# Patient Record
Sex: FEMALE | Race: WHITE | NOT HISPANIC OR LATINO | Employment: OTHER | URBAN - METROPOLITAN AREA
[De-identification: names, ages, dates, MRNs, and addresses within clinical notes are randomized per-mention and may not be internally consistent; named-entity substitution may affect disease eponyms.]

---

## 2021-01-20 ENCOUNTER — TRANSFERRED RECORDS (OUTPATIENT)
Dept: HEALTH INFORMATION MANAGEMENT | Facility: CLINIC | Age: 66
End: 2021-01-20

## 2021-01-20 ENCOUNTER — MEDICAL CORRESPONDENCE (OUTPATIENT)
Dept: HEALTH INFORMATION MANAGEMENT | Facility: CLINIC | Age: 66
End: 2021-01-20

## 2021-01-26 ENCOUNTER — TRANSCRIBE ORDERS (OUTPATIENT)
Dept: OTHER | Age: 66
End: 2021-01-26

## 2021-01-26 DIAGNOSIS — G90.89 AUTOIMMUNE AUTONOMIC GANGLIONOPATHY: Primary | ICD-10-CM

## 2021-02-10 ENCOUNTER — DOCUMENTATION ONLY (OUTPATIENT)
Dept: CARE COORDINATION | Facility: CLINIC | Age: 66
End: 2021-02-10

## 2021-03-09 ENCOUNTER — TELEPHONE (OUTPATIENT)
Dept: NEUROLOGY | Facility: CLINIC | Age: 66
End: 2021-03-09

## 2021-03-09 ENCOUNTER — OFFICE VISIT (OUTPATIENT)
Dept: NEUROLOGY | Facility: CLINIC | Age: 66
End: 2021-03-09
Attending: PSYCHIATRY & NEUROLOGY
Payer: MEDICARE

## 2021-03-09 DIAGNOSIS — G62.9 SMALL FIBER NEUROPATHY: ICD-10-CM

## 2021-03-09 DIAGNOSIS — R20.9 DISTURBANCE OF SKIN SENSATION: Primary | ICD-10-CM

## 2021-03-09 PROCEDURE — 11105 PUNCH BX SKIN EA SEP/ADDL: CPT | Performed by: PSYCHIATRY & NEUROLOGY

## 2021-03-09 PROCEDURE — 11104 PUNCH BX SKIN SINGLE LESION: CPT | Performed by: PSYCHIATRY & NEUROLOGY

## 2021-03-09 NOTE — PROGRESS NOTES
Procedure note: Skin biopsy. Indication: sensory disturbance, skin. After obtaining informed consent, 3 mm PUNCH skin biopsies were performed over the extensor digitorum brevis muscle of the right foot, and the right medial calf, about 10 cm distally to the medial knee joint. 1% lidocaine anesthesia and betadine sterile prep were used. The patient tolerated the procedure well. Wound care and patient education were provided by Kristy Behling, REEGT. Following completion of the procedure, both the performing physician and the assistant, independently verified the presence of one skin sample in each of the two (2) vials sent to the Lab. Aroldo Alvarez MD

## 2021-03-09 NOTE — LETTER
3/9/2021       RE: Margarita Abdi  [address] [facility] [place] [address]     Dear Colleague,    Thank you for referring your patient, Margarita Abdi, to the [facility] [place] [facility]. Please see a copy of my visit note below.    Procedure note: Skin biopsy. Indication: sensory disturbance, skin. After obtaining informed consent, 3 mm PUNCH skin biopsies were performed over the extensor digitorum brevis muscle of the right foot, and the right medial calf, about 10 cm distally to the medial knee joint. 1% lidocaine anesthesia and betadine sterile prep were used. The patient tolerated the procedure well. Wound care and patient education were provided by Kristy Behling, REEGT. Following completion of the procedure, both the performing physician and the assistant, independently verified the presence of one skin sample in each of the two (2) vials sent to the Lab. Aroldo Alvarez MD

## 2021-03-22 LAB — LAB SCANNED RESULT: NORMAL

## 2021-04-10 ENCOUNTER — HEALTH MAINTENANCE LETTER (OUTPATIENT)
Age: 66
End: 2021-04-10

## 2021-07-31 ENCOUNTER — HEALTH MAINTENANCE LETTER (OUTPATIENT)
Age: 66
End: 2021-07-31

## 2021-09-25 ENCOUNTER — HEALTH MAINTENANCE LETTER (OUTPATIENT)
Age: 66
End: 2021-09-25

## 2022-03-28 ENCOUNTER — CARE COORDINATION (OUTPATIENT)
Dept: SLEEP MEDICINE | Facility: CLINIC | Age: 67
End: 2022-03-28
Payer: MEDICARE

## 2022-03-28 DIAGNOSIS — G47.52 RBD (REM BEHAVIORAL DISORDER): Primary | ICD-10-CM

## 2022-03-28 NOTE — PROGRESS NOTES
Brief Sleep Staff Note    Patient with history of autonomic symptoms (orthostasis, constipation) along with anosmia and symptoms suggestive of REM sleep behavior disorder (vivid dreams, loud sleep talking, ? Dream enactment).      The patients bedtime is 2200, reads until 2300.     No troubles falling asleep.    Intermittent awakenings until 0730.     She is sluggish in the AM    Parasomnias-Sleep talking    Assessment/Plan  Parasomnias possible REM sleep behavior disorder in setting of autonomic disease raises concerns for pure autonomic failure and/or multiple system atrophy.      Discussed benefits of identifying RBD both in terms of management to avoid sleep related injury as well as to better understand her other ancillary neurological symptoms and whether they fit into a neurological syndrome.      All questions were answered.    It is a great privilege being asked to participate in this patients care.  The patient has been advised on the importance in of never operating operating a motor vehicle while tired or sleepy.

## 2022-05-07 ENCOUNTER — HEALTH MAINTENANCE LETTER (OUTPATIENT)
Age: 67
End: 2022-05-07

## 2022-05-22 ENCOUNTER — THERAPY VISIT (OUTPATIENT)
Dept: SLEEP MEDICINE | Facility: CLINIC | Age: 67
End: 2022-05-22
Attending: PSYCHIATRY & NEUROLOGY
Payer: MEDICARE

## 2022-05-22 DIAGNOSIS — G47.52 RBD (REM BEHAVIORAL DISORDER): ICD-10-CM

## 2022-05-22 PROCEDURE — 95810 POLYSOM 6/> YRS 4/> PARAM: CPT | Performed by: PSYCHIATRY & NEUROLOGY

## 2022-05-24 NOTE — PROCEDURES
" SLEEP STUDY INTERPRETATION  DIAGNOSTIC POLYSOMNOGRAPHY REPORT      Patient: RILEY VEGA  YOB: 1955  Study Date: 5/22/2022  MRN: [id number]  Referring Provider: MD Perez Adam  Ordering Provider: MD Sethi Michael    Indications for Polysomnography: The patient is a 67 year old Female who is 5' 11\" and weighs 145.0 lbs. Her BMI is 20.3, [place] sleepiness scale 14 and neck circumference is 32 cm. Relevant medical history includes dream enactment, autonomic dysfunction. A diagnostic polysomnogram was performed to evaluate for RBD.    Polysomnogram Data: A full night polysomnogram recorded the standard physiologic parameters including EEG, EOG, EMG, ECG, nasal and oral airflow. Respiratory parameters of chest and abdominal movements were recorded with respiratory inductance plethysmography. Oxygen saturation was recorded by pulse oximetry. Hypopnea scoring rule used: 1B 4%.    Sleep Architecture: Sleep fragmentation  The total recording time of the polysomnogram was 491.5 minutes. The total sleep time was 419.0 minutes. Sleep latency was 13.0 minutes. REM latency was 91.5 minutes. Arousal index was 48.7 arousals per hour. Sleep efficiency was 85.2%. Wake after sleep onset was 59.5 minutes. The patient spent 4.1% of total sleep time in Stage N1, 74.0% in Stage N2, 11.7% in Stage N3, and 10.3% in REM. Time in REM supine was 0 minutes.    Respiration: This study did not demonstrate clinically significant sleep disordered breathing.  This was a good study but did not include REM supine.      Events ? The polysomnogram revealed a presence of 1 obstructive, - central, and - mixed apneas resulting in an apnea index of 0.1 events per hour. There were 2 obstructive hypopneas and - central hypopneas resulting in an obstructive hypopnea index of 0.3 and central hypopnea index of - events per hour. The combined apnea/hypopnea index was 0.4 events per hour (central apnea/hypopnea index was - events " per hour). The REM AHI was 1.4 events per hour. The supine AHI was 2.4 events per hour. The RERA index was 2.3 events per hour.  The RDI was 2.7 events per hour.    Snoring - was reported as mild.    Respiratory rate and pattern - was notable for normal respiratory rate and pattern.    Sustained Sleep Associated Hypoventilation - Transcutaneous carbon dioxide monitoring was not used.    Sleep Associated Hypoxemia - (Greater than 5 minutes O2 sat at or below 88%) was not present. Baseline oxygen saturation was 95.0%. Lowest oxygen saturation was 88.0%. Time spent less than or equal to 88% was 0 minutes. Time spent less than or equal to 89% was 0 minutes.    Movement Activity: Increased motor activity throughout sleep (increased PLMs during NREM and increased transient motor activity during REM).      Periodic Limb Activity - There were 732 PLMs during the entire study. The PLM index was 104.8 movements per hour. The PLM Arousal Index was 33.8 per hour.    REM EMG Activity - Excessive transient muscle activity was present.    Nocturnal Behavior - Abnormal sleep related behaviors were noted during REM sleep. The behaviors appeared to be consistent with dream enactment. These events were characterized as hand babbling with vocalization.    Bruxism - None apparent.    Cardiac Summary: Sinus  The average pulse rate was 67.4 bpm. The minimum pulse rate was 60.0 bpm while the maximum pulse rate was 87.0 bpm.      Assessment:     This study did not demonstrate clinically significant sleep disordered breathing.  This was a good study but did not include REM supine.      Increased motor activity throughout sleep (increased PLMs during NREM and increased transient motor activity during REM).      Recommendations:    Recommend a diagnosis and management of RBD.   o Environmental safety  o High dose melatonin and/or low dose clonazepam    Patient may be reassured that per this study they did not demonstrate clinically significant  sleep disordered breathing. Advice regarding the risks of drowsy driving.    Suggest optimizing sleep schedule and avoiding sleep deprivation.    Treatment of PLMs (alpha 2 delta ligands, dopaminergics) should be focused upon symptoms of motor restlessness, a high suspicion for PLM disorder and not for PLMs alone.    Diagnostic Codes: G47.52      Frederic Sethi MD 5-24-22  Diplomate, ABPN Sleep Medicine

## 2022-05-25 LAB — SLPCOMP: NORMAL

## 2022-07-17 ASSESSMENT — SLEEP AND FATIGUE QUESTIONNAIRES
HOW LIKELY ARE YOU TO NOD OFF OR FALL ASLEEP WHEN YOU ARE A PASSENGER IN A CAR FOR AN HOUR WITHOUT A BREAK: MODERATE CHANCE OF DOZING
HOW LIKELY ARE YOU TO NOD OFF OR FALL ASLEEP WHILE LYING DOWN TO REST IN THE AFTERNOON WHEN CIRCUMSTANCES PERMIT: HIGH CHANCE OF DOZING
HOW LIKELY ARE YOU TO NOD OFF OR FALL ASLEEP WHILE SITTING QUIETLY AFTER LUNCH WITHOUT ALCOHOL: MODERATE CHANCE OF DOZING
HOW LIKELY ARE YOU TO NOD OFF OR FALL ASLEEP WHILE SITTING INACTIVE IN A PUBLIC PLACE: SLIGHT CHANCE OF DOZING
HOW LIKELY ARE YOU TO NOD OFF OR FALL ASLEEP WHILE SITTING AND READING: MODERATE CHANCE OF DOZING
HOW LIKELY ARE YOU TO NOD OFF OR FALL ASLEEP IN A CAR, WHILE STOPPED FOR A FEW MINUTES IN TRAFFIC: WOULD NEVER DOZE
HOW LIKELY ARE YOU TO NOD OFF OR FALL ASLEEP WHILE SITTING AND TALKING TO SOMEONE: WOULD NEVER DOZE
HOW LIKELY ARE YOU TO NOD OFF OR FALL ASLEEP WHILE WATCHING TV: SLIGHT CHANCE OF DOZING

## 2022-07-18 ENCOUNTER — OFFICE VISIT (OUTPATIENT)
Dept: SLEEP MEDICINE | Facility: CLINIC | Age: 67
End: 2022-07-18
Payer: MEDICARE

## 2022-07-18 VITALS
WEIGHT: 140 LBS | HEART RATE: 86 BPM | HEIGHT: 71 IN | OXYGEN SATURATION: 99 % | SYSTOLIC BLOOD PRESSURE: 87 MMHG | BODY MASS INDEX: 19.6 KG/M2 | DIASTOLIC BLOOD PRESSURE: 56 MMHG

## 2022-07-18 DIAGNOSIS — G47.52 RBD (REM BEHAVIORAL DISORDER): Primary | ICD-10-CM

## 2022-07-18 PROCEDURE — 99214 OFFICE O/P EST MOD 30 MIN: CPT | Performed by: PSYCHIATRY & NEUROLOGY

## 2022-07-18 RX ORDER — LETROZOLE 2.5 MG/1
2.5 TABLET, FILM COATED ORAL
COMMUNITY
Start: 2021-07-29 | End: 2022-07-24

## 2022-07-18 RX ORDER — FLUDROCORTISONE ACETATE 0.1 MG/1
0.1 TABLET ORAL
COMMUNITY
Start: 2022-03-16

## 2022-07-18 RX ORDER — TOPIRAMATE 100 MG/1
TABLET, FILM COATED ORAL
COMMUNITY
Start: 2020-10-22

## 2022-07-18 RX ORDER — BUPROPION HYDROCHLORIDE 75 MG/1
75 TABLET ORAL
COMMUNITY
Start: 2022-04-27

## 2022-07-18 RX ORDER — GABAPENTIN 100 MG/1
100 CAPSULE ORAL
COMMUNITY
Start: 2022-05-19

## 2022-07-18 RX ORDER — MIDODRINE HYDROCHLORIDE 5 MG/1
TABLET ORAL
COMMUNITY
Start: 2022-05-03

## 2022-07-18 RX ORDER — PYRIDOSTIGMINE BROMIDE 60 MG/1
60 TABLET ORAL
COMMUNITY
Start: 2021-12-07

## 2022-07-18 RX ORDER — FERROUS SULFATE 325(65) MG
TABLET ORAL
COMMUNITY

## 2022-07-18 RX ORDER — CYANOCOBALAMIN 1000 UG/ML
1000 INJECTION, SOLUTION INTRAMUSCULAR; SUBCUTANEOUS
COMMUNITY
Start: 2022-07-13 | End: 2023-06-13

## 2022-07-18 NOTE — NURSING NOTE
"Chief Complaint   Patient presents with     Study Results     PSG f/u       Initial BP (!) 87/56   Pulse 86   Ht 1.791 m (5' 10.5\")   Wt 63.5 kg (140 lb)   SpO2 99%   BMI 19.80 kg/m   Estimated body mass index is 19.8 kg/m  as calculated from the following:    Height as of this encounter: 1.791 m (5' 10.5\").    Weight as of this encounter: 63.5 kg (140 lb).    Medication Reconciliation: complete  ESS 11  Soraya Echavarria MA  "

## 2023-01-18 ENCOUNTER — TELEPHONE (OUTPATIENT)
Dept: NUCLEAR MEDICINE | Facility: CLINIC | Age: 68
End: 2023-01-18

## 2023-02-01 ENCOUNTER — TELEPHONE (OUTPATIENT)
Dept: CARDIOLOGY | Facility: CLINIC | Age: 68
End: 2023-02-01
Payer: MEDICARE

## 2023-02-01 NOTE — TELEPHONE ENCOUNTER
VM left on home and mobile numbers to hold Bupropion starting today for 2/9 scan. Left detailed message and requested a call back.

## 2023-02-09 ENCOUNTER — ANCILLARY PROCEDURE (OUTPATIENT)
Dept: NUCLEAR MEDICINE | Facility: CLINIC | Age: 68
End: 2023-02-09
Attending: PSYCHIATRY & NEUROLOGY
Payer: MEDICARE

## 2023-02-09 DIAGNOSIS — G20.A1 PARKINSON'S DISEASE (H): ICD-10-CM

## 2023-02-09 PROCEDURE — 78803 RP LOCLZJ TUM SPECT 1 AREA: CPT | Performed by: RADIOLOGY

## 2023-02-09 PROCEDURE — A9584 IODINE I-123 IOFLUPANE: HCPCS | Performed by: RADIOLOGY

## 2023-02-09 RX ORDER — IOFLUPANE I-123 2 MCI/ML
4-6 INJECTION, SOLUTION INTRAVENOUS ONCE
Status: COMPLETED | OUTPATIENT
Start: 2023-02-09 | End: 2023-02-09

## 2023-02-09 RX ADMIN — IOFLUPANE I-123 4.9 MILLICURIE: 2 INJECTION, SOLUTION INTRAVENOUS at 11:12

## 2023-09-17 ENCOUNTER — HEALTH MAINTENANCE LETTER (OUTPATIENT)
Age: 68
End: 2023-09-17

## 2024-11-10 ENCOUNTER — HEALTH MAINTENANCE LETTER (OUTPATIENT)
Age: 69
End: 2024-11-10

## (undated) RX ORDER — LIDOCAINE HYDROCHLORIDE 10 MG/ML
INJECTION, SOLUTION EPIDURAL; INFILTRATION; INTRACAUDAL; PERINEURAL
Status: DISPENSED
Start: 2021-03-09